# Patient Record
(demographics unavailable — no encounter records)

---

## 2025-05-22 NOTE — ASSESSMENT
[FreeTextEntry1] : Examination. Obtained nail biopsy of b/l 2nd toe dystrophic toe nails, will review results with patient when available. Aseptic debridement of dystrophic toe nails with sterile nippers and electric connie to patient's tolerance. Discussed nail dystrophy at length with the patient. Aseptic debridement of hyperkeratotic skin lesions, calluses x 2, with sterile #10 blade, patient tolerated treatment well.  Applied lotion to b/l feet. Instructed patient not to walk barefoot. Patient demonstrated verbal understanding of all instructions. Patient to return to office in 1 month.

## 2025-05-22 NOTE — HISTORY OF PRESENT ILLNESS
[FreeTextEntry1] : PCP: Estrada Valdes 64 year old female patient presents for complaint of toe pain. She states she is having a lot of pain and pressure toe her toes, and she thinks it is from her toe nails are and also areas of hard skin. Patient states she is not sure how long her toe nails have been thick and discolored for, or how long she has had areas of hard think skin on the feet but it has been a while. She states the areas each cause her pain in shoes, and when walking.

## 2025-05-22 NOTE — PHYSICAL EXAM
[General Appearance - Alert] : alert [General Appearance - In No Acute Distress] : in no acute distress [General Appearance - Well Nourished] : well nourished [de-identified] : No pain on palpation of feet b/l, no pain on ROM of foot and ankle b/l. [FreeTextEntry1] : SWMF 10/10 b/l, light touch intact WNL b/l, Achilles tendon reflex intact b/l

## 2025-06-20 NOTE — ASSESSMENT
[FreeTextEntry1] : Exam. Reviewed and discussed nail biopsy of b/l 2nd dystrophic toe nail, onychomycosis. Aseptic debridement of mycotic toe nails with sterile nippers and electric connie to patient's tolerance. Discussed onychomycosis and treatment options at length. Rx Formula 7, instructed patient on use of medication. Instructed patient to dry toe nails well, and change damp or wet sock and shoes to dry ones. Patient demonstrated verbal understanding of all instructions. Patient to return to office in 1 month.

## 2025-06-20 NOTE — HISTORY OF PRESENT ILLNESS
[FreeTextEntry1] : PCP: Estrada Valdes This 64 year old female patient returns for care of thick and discolored  toe nails which cause her pain. She states they have been less painful since debridement, they do cause pain in her shoes.

## 2025-06-20 NOTE — PHYSICAL EXAM
[General Appearance - Alert] : alert [General Appearance - In No Acute Distress] : in no acute distress [General Appearance - Well Nourished] : well nourished [de-identified] : No pain on ROM of foot and ankle b/, with no pain on palpation of feet and ankles b/l. [FreeTextEntry1] : SWMF 10/10 b/l, light touch intact WNL b/l, Achilles tendon reflex intact b/l